# Patient Record
Sex: FEMALE | Race: WHITE | NOT HISPANIC OR LATINO | URBAN - NONMETROPOLITAN AREA
[De-identification: names, ages, dates, MRNs, and addresses within clinical notes are randomized per-mention and may not be internally consistent; named-entity substitution may affect disease eponyms.]

---

## 2018-08-06 ENCOUNTER — HOSPITAL ENCOUNTER (EMERGENCY)
Facility: HOSPITAL | Age: 58
Discharge: HOME | End: 2018-08-06
Attending: FAMILY MEDICINE
Payer: COMMERCIAL

## 2018-08-06 ENCOUNTER — APPOINTMENT (OUTPATIENT)
Dept: RADIOLOGY | Facility: HOSPITAL | Age: 58
End: 2018-08-06
Attending: FAMILY MEDICINE
Payer: COMMERCIAL

## 2018-08-06 VITALS
DIASTOLIC BLOOD PRESSURE: 93 MMHG | SYSTOLIC BLOOD PRESSURE: 170 MMHG | HEART RATE: 79 BPM | RESPIRATION RATE: 18 BRPM | TEMPERATURE: 97.5 F

## 2018-08-06 DIAGNOSIS — F41.9 ANXIETY: Primary | ICD-10-CM

## 2018-08-06 DIAGNOSIS — R51.9 HEADACHE: ICD-10-CM

## 2018-08-06 LAB
ALBUMIN SERPL-MCNC: 3.9 G/DL (ref 3.5–5.3)
ALP SERPL-CCNC: 81 U/L (ref 46–118)
ALT SERPL-CCNC: 8 U/L (ref 0–52)
ANION GAP SERPL CALC-SCNC: 7 MMOL/L (ref 3–11)
AST SERPL-CCNC: 17 U/L (ref 0–39)
BACTERIA #/AREA URNS HPF: ABNORMAL /HPF
BASOPHILS # BLD AUTO: 0 10*3/UL
BASOPHILS NFR BLD AUTO: 0 % (ref 0–2)
BILIRUB SERPL-MCNC: 0.45 MG/DL (ref 0–1.4)
BILIRUB UR QL: NEGATIVE
BUN SERPL-MCNC: 11 MG/DL (ref 7–25)
CA PHOS CRY #/AREA URNS HPF: PRESENT /HPF
CALCIUM ALBUM COR SERPL-MCNC: 9 MG/DL (ref 8.5–10.1)
CALCIUM SERPL-MCNC: 8.9 MG/DL (ref 8.6–10.3)
CHLORIDE SERPL-SCNC: 113 MMOL/L (ref 98–107)
CLARITY UR: CLEAR
CO2 SERPL-SCNC: 23 MMOL/L (ref 21–32)
COLOR UR: YELLOW
CREAT SERPL-MCNC: 0.6 MG/DL (ref 0.6–1.2)
EOSINOPHIL # BLD AUTO: 0 10*3/UL
EOSINOPHIL NFR BLD AUTO: 0 % (ref 0–3)
ERYTHROCYTE [DISTWIDTH] IN BLOOD BY AUTOMATED COUNT: 12.6 % (ref 11.5–14)
GFR SERPL CREATININE-BSD FRML MDRD: 103 ML/MIN/1.73M*2
GLUCOSE SERPL-MCNC: 104 MG/DL (ref 70–105)
GLUCOSE UR QL: NEGATIVE MG/DL
HCT VFR BLD AUTO: 43.5 % (ref 34–45)
HGB BLD-MCNC: 14.9 G/DL (ref 11.5–15.5)
HGB UR QL: NEGATIVE
KETONES UR-MCNC: NEGATIVE MG/DL
LEUKOCYTE ESTERASE UR QL STRIP: ABNORMAL
LYMPHOCYTES # BLD AUTO: 3 10*3/UL
LYMPHOCYTES NFR BLD AUTO: 29 % (ref 11–47)
MCH RBC QN AUTO: 29.4 PG (ref 28–33)
MCHC RBC AUTO-ENTMCNC: 34.2 G/DL (ref 32–36)
MCV RBC AUTO: 85.8 FL (ref 81–97)
MONOCYTES # BLD AUTO: 0.5 10*3/UL
MONOCYTES NFR BLD AUTO: 4 % (ref 3–11)
NEUTROPHILS # BLD AUTO: 6.8 10*3/UL
NEUTROPHILS NFR BLD AUTO: 66 % (ref 41–81)
NITRITE UR QL: NEGATIVE
PH UR: 7 PH
PLATELET # BLD AUTO: 191 10*3/UL (ref 140–350)
PMV BLD AUTO: 8.8 FL (ref 6.9–10.8)
POTASSIUM SERPL-SCNC: 3.5 MMOL/L (ref 3.5–5.1)
PROT SERPL-MCNC: 6.5 G/DL (ref 6–8.3)
PROT UR STRIP-MCNC: NEGATIVE MG/DL
RBC # BLD AUTO: 5.07 10*6/ΜL (ref 3.7–5.3)
RBC #/AREA URNS HPF: ABNORMAL /HPF
SODIUM SERPL-SCNC: 143 MMOL/L (ref 135–145)
SP GR UR: 1.01 (ref 1–1.03)
SQUAMOUS #/AREA URNS HPF: ABNORMAL /HPF
TROPONIN I SERPL-MCNC: <0.03 NG/ML
UROBILINOGEN UR-MCNC: 0.2 E.U./DL
WBC # BLD AUTO: 10.3 10*3/UL (ref 4.5–10.5)
WBC #/AREA URNS HPF: ABNORMAL /HPF

## 2018-08-06 PROCEDURE — 96374 THER/PROPH/DIAG INJ IV PUSH: CPT

## 2018-08-06 PROCEDURE — 6370000100 HC RX 637 (ALT 250 FOR IP): Performed by: EMERGENCY MEDICINE

## 2018-08-06 PROCEDURE — 99284 EMERGENCY DEPT VISIT MOD MDM: CPT | Performed by: FAMILY MEDICINE

## 2018-08-06 PROCEDURE — 36415 COLL VENOUS BLD VENIPUNCTURE: CPT | Performed by: FAMILY MEDICINE

## 2018-08-06 PROCEDURE — 6360000200 HC RX 636 W HCPCS (ALT 250 FOR IP): Performed by: FAMILY MEDICINE

## 2018-08-06 PROCEDURE — 80053 COMPREHEN METABOLIC PANEL: CPT | Performed by: FAMILY MEDICINE

## 2018-08-06 PROCEDURE — 87088 URINE BACTERIA CULTURE: CPT | Performed by: FAMILY MEDICINE

## 2018-08-06 PROCEDURE — 93005 ELECTROCARDIOGRAM TRACING: CPT | Performed by: FAMILY MEDICINE

## 2018-08-06 PROCEDURE — 71046 X-RAY EXAM CHEST 2 VIEWS: CPT

## 2018-08-06 PROCEDURE — 2580000300 HC RX 258: Performed by: FAMILY MEDICINE

## 2018-08-06 PROCEDURE — 93010 ELECTROCARDIOGRAM REPORT: CPT | Mod: 59 | Performed by: INTERNAL MEDICINE

## 2018-08-06 PROCEDURE — 81001 URINALYSIS AUTO W/SCOPE: CPT | Performed by: FAMILY MEDICINE

## 2018-08-06 PROCEDURE — 85025 COMPLETE CBC W/AUTO DIFF WBC: CPT | Performed by: FAMILY MEDICINE

## 2018-08-06 PROCEDURE — 84484 ASSAY OF TROPONIN QUANT: CPT | Performed by: FAMILY MEDICINE

## 2018-08-06 PROCEDURE — 96375 TX/PRO/DX INJ NEW DRUG ADDON: CPT

## 2018-08-06 RX ORDER — ASPIRIN 81 MG/1
81 TABLET ORAL DAILY
COMMUNITY

## 2018-08-06 RX ORDER — METOCLOPRAMIDE HYDROCHLORIDE 5 MG/ML
10 INJECTION INTRAMUSCULAR; INTRAVENOUS ONCE
Status: COMPLETED | OUTPATIENT
Start: 2018-08-06 | End: 2018-08-06

## 2018-08-06 RX ORDER — KETOROLAC TROMETHAMINE 30 MG/ML
30 INJECTION, SOLUTION INTRAMUSCULAR; INTRAVENOUS ONCE
Status: COMPLETED | OUTPATIENT
Start: 2018-08-06 | End: 2018-08-06

## 2018-08-06 RX ORDER — SODIUM CHLORIDE 9 MG/ML
1000 INJECTION, SOLUTION INTRAVENOUS ONCE
Status: COMPLETED | OUTPATIENT
Start: 2018-08-06 | End: 2018-08-06

## 2018-08-06 RX ORDER — DIPHENHYDRAMINE HYDROCHLORIDE 50 MG/ML
50 INJECTION INTRAMUSCULAR; INTRAVENOUS ONCE
Status: COMPLETED | OUTPATIENT
Start: 2018-08-06 | End: 2018-08-06

## 2018-08-06 RX ORDER — LORAZEPAM 1 MG/1
1 TABLET ORAL ONCE
Status: COMPLETED | OUTPATIENT
Start: 2018-08-06 | End: 2018-08-06

## 2018-08-06 RX ORDER — LORAZEPAM 1 MG/1
2 TABLET ORAL ONCE
Status: COMPLETED | OUTPATIENT
Start: 2018-08-06 | End: 2018-08-06

## 2018-08-06 RX ORDER — IBUPROFEN 800 MG/1
1 TABLET ORAL ONCE
Status: COMPLETED | OUTPATIENT
Start: 2018-08-06 | End: 2018-08-06

## 2018-08-06 RX ADMIN — KETOROLAC TROMETHAMINE 30 MG: 30 INJECTION, SOLUTION INTRAMUSCULAR at 18:03

## 2018-08-06 RX ADMIN — IBUPROFEN 1 PACKAGE: 800 TABLET ORAL at 21:05

## 2018-08-06 RX ADMIN — METOCLOPRAMIDE 10 MG: 5 INJECTION, SOLUTION INTRAMUSCULAR; INTRAVENOUS at 19:18

## 2018-08-06 RX ADMIN — SODIUM CHLORIDE 1000 ML: 9 INJECTION, SOLUTION INTRAVENOUS at 17:06

## 2018-08-06 RX ADMIN — LORAZEPAM 1 PACKAGE: 1 TABLET ORAL at 21:05

## 2018-08-06 RX ADMIN — DIPHENHYDRAMINE HYDROCHLORIDE 50 MG: 50 INJECTION INTRAMUSCULAR; INTRAVENOUS at 19:18

## 2018-08-06 RX ADMIN — LORAZEPAM 2 MG: 1 TABLET ORAL at 20:00
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
## 2018-08-07 NOTE — ED PROVIDER NOTES
HPI: Chief complaint see Dr. Green's note  No chief complaint on file.      HPI: See Dr. Green's note    HISTORY:  Past Medical History:   Diagnosis Date   • Broken heart syndrome 02/2017   • Cancer (CMS/HCC)     history of cervical at age 19   • Hepatitis     Hep C. History of; has been treated.    • TIA (transient ischemic attack)        History reviewed. No pertinent surgical history.    Family History   Problem Relation Age of Onset   • Diabetes Mother        Social History   Substance Use Topics   • Smoking status: Former Smoker   • Smokeless tobacco: Never Used   • Alcohol use 0.6 oz/week     1 Cans of beer per week       ROS: See Dr. Green's note  Review of Systems    PE:  ED Triage Vitals [08/06/18 1755]   Temp Heart Rate Resp BP SpO2   36.7 °C (98.1 °F) 77 20 164/88 --      Temp Source Heart Rate Source Patient Position BP Location FiO2 (%)   Oral -- Head of bed 30 degrees or higher -- --       Physical Exam: See Dr. Green's note    ED LABS:  Labs Reviewed   COMPREHENSIVE METABOLIC PANEL - Abnormal        Result Value    Sodium 143      Potassium 3.5      Chloride 113 (*)     CO2 23      Anion Gap 7      BUN 11      Creatinine 0.6      Glucose 104      Calcium 8.9      AST 17      ALT (SGPT) 8      Alkaline Phosphatase 81      Total Protein 6.5      Albumin 3.9      Total Bilirubin 0.45      eGFR 103      Corrected Calcium 9.0      Narrative:     ESTIMATED GFR CALCULATED USING THE IDMS-TRACEABLE MDRD STUDY EQUATION WITH THE RESULT NORMALIZED TO 1.73M^2 BODY SURFACE AREA.    AVERAGE GFR BY AGE RANGE     20-30 years 116 mL/min/1.73m^2  30-40 years 107 mL/min/1.73m^2  40-50 years 99 mL/min/1.73m^2  50-60 years 93 mL/min/1.73m^2  60-70 years 85 mL/min/1.73m^2  70-up years 75 mL/min/1.73m^2   URINALYSIS DIPSTICK REFLEX TO CULTURE FOR USE WITH MICROSCOPIC PANEL - Abnormal     Color, Urine Yellow      Clarity, Urine Clear      pH, Urine 7.0      Specific Gravity, Urine 1.015      Protein, Urine Negative       Glucose, Urine Negative      Ketones, Urine Negative      Blood, Urine Negative      Nitrite, Urine Negative      Bilirubin, Urine Negative      Leukocytes, Urine Trace (*)     Urobilinogen, Urine 0.2     URINALYSIS MICROSCOPIC, REFLEX CULTURE - Abnormal     RBC, Urine 0-4      WBC, Urine 0-4      Squamous Epithelial, Urine 0-4      Bacteria, Urine None seen      Calcium Phosphate Crystals, Urine Present (*)    TROPONIN I - Normal    Troponin I <0.030     URINE CULTURE   CBC WITH AUTO DIFFERENTIAL    WBC 10.3      RBC 5.07      Hemoglobin 14.9      Hematocrit 43.5      MCV 85.8      MCH 29.4      MCHC 34.2      RDW 12.6      Platelets 191      MPV 8.8      Neutrophils% 66      Lymphocytes% 29      Monocytes% 4      Eosinophils% 0      Basophils% 0      Neutrophils Absolute 6.80      Lymphocytes Absolute 3.00      Monocytes Absolute 0.50      Eosinophils Absolute 0.00      Basophils Absolute 0.00     URINALYSIS WITH MICROSCOPIC, REFLEX CULTURE    Narrative:     The following orders were created for panel order Urinalysis w/microscopic, reflex culture Urine, Clean Catch.  Procedure                               Abnormality         Status                     ---------                               -----------         ------                     Urinalysis, dipstick Urin...[[id number]]  Abnormal            Final result               Urinalysis, microscopic U...[[id number]]  Abnormal            Final result                 Please view results for these tests on the individual orders.         ED IMAGES:  X-ray chest 2 views   Final Result   IMPRESSION:       1. Normal heart and lungs.          ED PROCEDURES:  Procedures    ED COURSE:       MDM: She is a pleasant 58-year-old female who was signed out to me by Dr. Green.  Patient's laboratory analysis including a urinalysis and troponin are unremarkable patient also did have 2 EKGs and here that did not show any acute ischemia patient is actually not having any chest pain.   Her headache is improved and patient states that she had felt anxious and does have a history of anxiety which she states is now better she is actually been able to sleep without any difficulty and also ate a little bit.  She is otherwise nontoxic appearing with vitals within normal limits for the patient.  Her chest x-ray is negative.  She does not have any stroke symptoms she is a GCS of 15 and has 5 out of 5 motor strength about the lower extremities and cranial nerves II through XII grossly intact with a non-ataxic gait and normal speech.  Patient is otherwise well-appearing is discharged with some Ativan to help her with her anxiety also some ibuprofen if her headache returns.  She states her headache is improved significantly.  Disposition the patient is discharged home in stable condition  MDM    Final diagnoses:   [F41.9] Anxiety   [R51] Headache        Harsha Foley DO  08/06/18 [id number]

## 2018-08-08 LAB — BACTERIA UR CULT: NORMAL

## 2019-01-24 ENCOUNTER — DOCUMENTATION ONLY (OUTPATIENT)
Dept: CARE COORDINATION | Facility: CLINIC | Age: 59
End: 2019-01-24

## 2020-11-06 ENCOUNTER — IMPORTED ENCOUNTER (OUTPATIENT)
Dept: URBAN - NONMETROPOLITAN AREA CLINIC 1 | Facility: CLINIC | Age: 60
End: 2020-11-06

## 2020-11-06 PROCEDURE — 92015 DETERMINE REFRACTIVE STATE: CPT

## 2020-11-06 PROCEDURE — 92014 COMPRE OPH EXAM EST PT 1/>: CPT

## 2020-11-06 NOTE — PATIENT DISCUSSION
Astigmatism-Discussed diagnosis with patient. Presbyopia-Discussed diagnosis with patient. Updated spec Rx given. Recommend lens that will provide comfort as well as protect safety and health of eyes.; 's Notes: Formerly  to [employer] brother [place]. Former  of [employer]. 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 Has four gchildren. Now works with DWI cases.

## 2022-04-10 ASSESSMENT — VISUAL ACUITY
OD_SC: 20/30
OS_CC: J1
OD_CC: J1
OS_SC: 20/30

## 2022-04-10 ASSESSMENT — TONOMETRY
OS_IOP_MMHG: 18
OD_IOP_MMHG: 18

## 2023-04-17 ENCOUNTER — COMPREHENSIVE EXAM (OUTPATIENT)
Dept: RURAL CLINIC 1 | Facility: CLINIC | Age: 63
End: 2023-04-17

## 2023-04-17 DIAGNOSIS — H52.223: ICD-10-CM

## 2023-04-17 DIAGNOSIS — H52.4: ICD-10-CM

## 2023-04-17 PROCEDURE — 92015 DETERMINE REFRACTIVE STATE: CPT

## 2023-04-17 PROCEDURE — 92014 COMPRE OPH EXAM EST PT 1/>: CPT

## 2023-04-17 ASSESSMENT — TONOMETRY
OS_IOP_MMHG: 18
OD_IOP_MMHG: 19

## 2023-04-17 ASSESSMENT — VISUAL ACUITY
OS_CC: 20/20
OU_CC: 20/30
OD_CC: 20/25

## 2025-06-11 ENCOUNTER — TRANSCRIBE ORDERS (OUTPATIENT)
Dept: OTHER | Age: 65
End: 2025-06-11

## 2025-06-11 DIAGNOSIS — G20.A1 PARKINSON'S DISEASE WITHOUT DYSKINESIA OR FLUCTUATING MANIFESTATIONS (H): Primary | ICD-10-CM
